# Patient Record
Sex: FEMALE | Race: OTHER | HISPANIC OR LATINO | URBAN - METROPOLITAN AREA
[De-identification: names, ages, dates, MRNs, and addresses within clinical notes are randomized per-mention and may not be internally consistent; named-entity substitution may affect disease eponyms.]

---

## 2021-04-30 ENCOUNTER — EMERGENCY (EMERGENCY)
Facility: HOSPITAL | Age: 26
LOS: 1 days | Discharge: HOME | End: 2021-04-30
Attending: EMERGENCY MEDICINE
Payer: MEDICAID

## 2021-04-30 VITALS
OXYGEN SATURATION: 97 % | HEART RATE: 77 BPM | DIASTOLIC BLOOD PRESSURE: 71 MMHG | SYSTOLIC BLOOD PRESSURE: 120 MMHG | TEMPERATURE: 98 F | RESPIRATION RATE: 16 BRPM

## 2021-04-30 PROCEDURE — 99282 EMERGENCY DEPT VISIT SF MDM: CPT

## 2021-04-30 NOTE — ED PROVIDER NOTE - CLINICAL SUMMARY MEDICAL DECISION MAKING FREE TEXT BOX
Pt with tenderness, lump and discharge from the right breast. Does not appear to be cystic, could be fibroadenoma. Due to the d/c from the breast, patient to f/u with PCP to do endocrine labs f/u. Pt with tenderness, lump and discharge from the right breast. Does not appear to be cystic, could be fibroadenoma. Due to the d/c from the breast, patient to f/u with PCP to do endocrine labs f/u with breast surgeon. referral given

## 2021-04-30 NOTE — ED PROVIDER NOTE - OBJECTIVE STATEMENT
25 y/o F pt with a significant PMHx of IUD presents to the ED with c/o righr breast pain and lump at the 3 o clock region lateral to the areola. Pt states having 2d of right side of the breast pain. No FHx of breast cancer but what concern her was the d/c from the right nipple. Patient reports that the discharge was milky. No weight loss, no fever. Pt states not being sexually active for the 4 years and does not think that it is due to that. Patient reports having some occasional headache and think that it is due to her glasses. 27 y/o F pt with a significant PMHx of IUD presents to the ED with c/o right breast pain and lump at the 3 o clock region lateral to the areola. Pt states having 2d of right side of the breast pain. No FHx of breast cancer but what concern her was the d/c from the right nipple. Patient reports that the discharge was milky. No weight loss, no fever. Pt states not being sexually active over 1 month and had recently check pregnancy status and was neg. also hasn't breast fed for over 4 years. Patient reports having some occasional headache and think that it is due to her glasses.

## 2021-04-30 NOTE — ED PROVIDER NOTE - MUSCULOSKELETAL MINIMAL EXAM
tenderness to palpation and firm tissues at the 3 o clock position, bilateral areola is nrml. Unable to express d/c from the right nipple. tenderness to palpation and firm tissues at the 3 o clock position, bilateral areola is nrml. Unable to express d/c from the right nipple otherwise normal breast tissue and nipple.

## 2021-04-30 NOTE — ED PROVIDER NOTE - PATIENT PORTAL LINK FT
You can access the FollowMyHealth Patient Portal offered by [facility] by registering at the following website: [email or web address]. By joining [facility]’s FollowMyHealth portal, you will also be able to view your health information using other applications (apps) compatible with our system.